# Patient Record
Sex: FEMALE | Race: BLACK OR AFRICAN AMERICAN | NOT HISPANIC OR LATINO | Employment: OTHER | URBAN - METROPOLITAN AREA
[De-identification: names, ages, dates, MRNs, and addresses within clinical notes are randomized per-mention and may not be internally consistent; named-entity substitution may affect disease eponyms.]

---

## 2017-06-08 ENCOUNTER — NEW REFERRAL (OUTPATIENT)
Dept: URBAN - METROPOLITAN AREA CLINIC 26 | Facility: CLINIC | Age: 72
End: 2017-06-08

## 2017-06-08 VITALS
WEIGHT: 172 LBS | HEIGHT: 66 IN | HEART RATE: 74 BPM | BODY MASS INDEX: 27.64 KG/M2 | SYSTOLIC BLOOD PRESSURE: 140 MMHG | DIASTOLIC BLOOD PRESSURE: 94 MMHG

## 2017-06-08 DIAGNOSIS — H25.9: ICD-10-CM

## 2017-06-08 DIAGNOSIS — H43.393: ICD-10-CM

## 2017-06-08 DIAGNOSIS — H35.372: ICD-10-CM

## 2017-06-08 DIAGNOSIS — H04.123: ICD-10-CM

## 2017-06-08 DIAGNOSIS — H02.833: ICD-10-CM

## 2017-06-08 DIAGNOSIS — H02.836: ICD-10-CM

## 2017-06-08 PROCEDURE — 92225 OPHTHALMOSCOPY (INITIAL): CPT

## 2017-06-08 PROCEDURE — 92134 CPTRZ OPH DX IMG PST SGM RTA: CPT

## 2017-06-08 PROCEDURE — 92004 COMPRE OPH EXAM NEW PT 1/>: CPT

## 2017-06-08 ASSESSMENT — TONOMETRY
OS_IOP_MMHG: 14
OD_IOP_MMHG: 17

## 2017-06-08 ASSESSMENT — VISUAL ACUITY
OD_PH: 20/25-2
OD_SC: 20/50-1
OS_PH: 20/30-2
OS_SC: 20/50-1

## 2018-06-05 ENCOUNTER — FOLLOW UP (OUTPATIENT)
Dept: URBAN - METROPOLITAN AREA CLINIC 26 | Facility: CLINIC | Age: 73
End: 2018-06-05

## 2018-06-05 VITALS
SYSTOLIC BLOOD PRESSURE: 138 MMHG | BODY MASS INDEX: 26.03 KG/M2 | DIASTOLIC BLOOD PRESSURE: 82 MMHG | WEIGHT: 162 LBS | HEART RATE: 76 BPM | HEIGHT: 66 IN

## 2018-06-05 DIAGNOSIS — H35.372: ICD-10-CM

## 2018-06-05 DIAGNOSIS — H35.342: ICD-10-CM

## 2018-06-05 DIAGNOSIS — H43.393: ICD-10-CM

## 2018-06-05 PROCEDURE — 92134 CPTRZ OPH DX IMG PST SGM RTA: CPT

## 2018-06-05 PROCEDURE — 92014 COMPRE OPH EXAM EST PT 1/>: CPT

## 2018-06-05 ASSESSMENT — TONOMETRY
OS_IOP_MMHG: 13
OD_IOP_MMHG: 12

## 2018-06-05 ASSESSMENT — VISUAL ACUITY
OS_SC: 20/30-2
OS_PH: 20/25-2
OD_PH: 20/25+1
OD_SC: 20/50-2

## 2019-06-06 ENCOUNTER — FOLLOW UP (OUTPATIENT)
Dept: URBAN - METROPOLITAN AREA CLINIC 26 | Facility: CLINIC | Age: 74
End: 2019-06-06

## 2019-06-06 VITALS — HEIGHT: 66 IN | BODY MASS INDEX: 26.03 KG/M2 | WEIGHT: 162 LBS

## 2019-06-06 DIAGNOSIS — H35.342: ICD-10-CM

## 2019-06-06 DIAGNOSIS — H35.372: ICD-10-CM

## 2019-06-06 DIAGNOSIS — H04.123: ICD-10-CM

## 2019-06-06 DIAGNOSIS — H25.9: ICD-10-CM

## 2019-06-06 DIAGNOSIS — H43.393: ICD-10-CM

## 2019-06-06 DIAGNOSIS — H35.363: ICD-10-CM

## 2019-06-06 DIAGNOSIS — H02.833: ICD-10-CM

## 2019-06-06 DIAGNOSIS — H02.836: ICD-10-CM

## 2019-06-06 PROCEDURE — 92250 FUNDUS PHOTOGRAPHY W/I&R: CPT

## 2019-06-06 PROCEDURE — 92014 COMPRE OPH EXAM EST PT 1/>: CPT

## 2019-06-06 ASSESSMENT — TONOMETRY
OS_IOP_MMHG: 13
OD_IOP_MMHG: 13

## 2019-06-06 ASSESSMENT — VISUAL ACUITY
OD_SC: 20/40+2
OD_PH: 20/25+2
OS_SC: 20/30-2

## 2022-07-09 ENCOUNTER — TELEPHONE ENCOUNTER (OUTPATIENT)
Dept: URBAN - METROPOLITAN AREA CLINIC 121 | Facility: CLINIC | Age: 77
End: 2022-07-09

## 2022-07-09 RX ORDER — OXAPROZIN 600 MG/1
TABLET ORAL
Refills: 0 | OUTPATIENT
Start: 2009-10-28 | End: 2015-04-14

## 2022-07-09 RX ORDER — SIMVASTATIN 20 MG/1
TABLET, FILM COATED ORAL
Refills: 0 | OUTPATIENT
Start: 2009-10-28 | End: 2015-04-14

## 2022-07-09 RX ORDER — OMEPRAZOLE 20 MG/1
TABLET, DELAYED RELEASE ORAL ONCE A DAY
Refills: 11 | OUTPATIENT
Start: 2009-11-16 | End: 2011-01-18

## 2022-07-09 RX ORDER — BENAZEPRIL HYDROCHLORIDE 20 MG/1
TABLET, FILM COATED ORAL
Refills: 0 | OUTPATIENT
Start: 2009-10-28 | End: 2015-04-14

## 2022-07-09 RX ORDER — VALSARTAN AND HYDROCHLOROTHIAZIDE 320; 25 MG/1; MG/1
TABLET, FILM COATED ORAL
Refills: 0 | OUTPATIENT
Start: 2009-10-28 | End: 2015-04-14

## 2022-07-09 RX ORDER — FLUTICASONE PROPIONATE 50 UG/1
SPRAY, METERED NASAL
Refills: 0 | OUTPATIENT
Start: 2009-10-28 | End: 2015-04-14

## 2022-07-10 ENCOUNTER — TELEPHONE ENCOUNTER (OUTPATIENT)
Dept: URBAN - METROPOLITAN AREA CLINIC 121 | Facility: CLINIC | Age: 77
End: 2022-07-10

## 2022-07-10 RX ORDER — MAG HYDROX/ALUMINUM HYD/SIMETH 400-400-40
SUSPENSION, ORAL (FINAL DOSE FORM) ORAL
Refills: 0 | Status: ACTIVE | COMMUNITY
Start: 2015-04-14

## 2022-07-10 RX ORDER — VALSARTAN AND HYDROCHLOROTHIAZIDE 160; 25 MG/1; MG/1
TABLET, FILM COATED ORAL ONCE A DAY
Refills: 0 | Status: ACTIVE | COMMUNITY
Start: 2015-04-14

## 2022-07-10 RX ORDER — ASCORBIC ACID 125 MG
TABLET,CHEWABLE ORAL
Refills: 0 | Status: ACTIVE | COMMUNITY
Start: 2015-04-14

## 2022-07-10 RX ORDER — CETIRIZINE HYDROCHLORIDE 10 MG/1
CAPSULE, LIQUID FILLED ORAL ONCE A DAY
Refills: 0 | Status: ACTIVE | COMMUNITY
Start: 2015-04-14

## 2022-07-10 RX ORDER — OMEPRAZOLE 20 MG/1
TABLET, DELAYED RELEASE ORAL ONCE A DAY
Refills: 11 | Status: ACTIVE | COMMUNITY
Start: 2011-01-18

## 2022-07-10 RX ORDER — UBIDECARENONE/VIT E ACET 100MG-5
CAPSULE ORAL
Refills: 0 | Status: ACTIVE | COMMUNITY
Start: 2015-04-14

## 2022-07-10 RX ORDER — OMEPRAZOLE 20 MG/1
TABLET, DELAYED RELEASE ORAL TWICE A DAY
Refills: 0 | Status: ACTIVE | COMMUNITY
Start: 2015-04-14

## 2022-07-10 RX ORDER — TRAMADOL HYDROCHLORIDE 50 MG/1
TABLET ORAL TAKE AS DIRECTED
Refills: 0 | Status: ACTIVE | COMMUNITY
Start: 2015-04-14

## 2022-07-10 RX ORDER — CELECOXIB 200 MG
CAPSULE ORAL ONCE A DAY
Refills: 0 | Status: ACTIVE | COMMUNITY
Start: 2015-04-14

## 2025-04-15 ENCOUNTER — OFFICE VISIT (OUTPATIENT)
Dept: URBAN - METROPOLITAN AREA CLINIC 63 | Facility: CLINIC | Age: 80
End: 2025-04-15

## 2025-04-15 ENCOUNTER — DASHBOARD ENCOUNTERS (OUTPATIENT)
Age: 80
End: 2025-04-15

## 2025-04-15 RX ORDER — TRAMADOL HYDROCHLORIDE 50 MG/1
TABLET ORAL TAKE AS DIRECTED
Refills: 0 | Status: ACTIVE | COMMUNITY
Start: 2015-04-14

## 2025-04-15 RX ORDER — OMEPRAZOLE 20 MG/1
TABLET, DELAYED RELEASE ORAL ONCE A DAY
Refills: 11 | Status: ACTIVE | COMMUNITY
Start: 2011-01-18

## 2025-04-15 RX ORDER — MAG HYDROX/ALUMINUM HYD/SIMETH 400-400-40
SUSPENSION, ORAL (FINAL DOSE FORM) ORAL
Refills: 0 | Status: ACTIVE | COMMUNITY
Start: 2015-04-14

## 2025-04-15 RX ORDER — CETIRIZINE HYDROCHLORIDE 10 MG/1
CAPSULE, LIQUID FILLED ORAL ONCE A DAY
Refills: 0 | Status: ACTIVE | COMMUNITY
Start: 2015-04-14

## 2025-04-15 RX ORDER — ASCORBIC ACID 125 MG
TABLET,CHEWABLE ORAL
Refills: 0 | Status: ACTIVE | COMMUNITY
Start: 2015-04-14

## 2025-04-15 RX ORDER — CELECOXIB 200 MG
CAPSULE ORAL ONCE A DAY
Refills: 0 | Status: ACTIVE | COMMUNITY
Start: 2015-04-14

## 2025-04-15 RX ORDER — VALSARTAN AND HYDROCHLOROTHIAZIDE 160; 25 MG/1; MG/1
TABLET, FILM COATED ORAL ONCE A DAY
Refills: 0 | Status: ACTIVE | COMMUNITY
Start: 2015-04-14

## 2025-04-15 RX ORDER — UBIDECARENONE/VIT E ACET 100MG-5
CAPSULE ORAL
Refills: 0 | Status: ACTIVE | COMMUNITY
Start: 2015-04-14

## 2025-04-15 NOTE — HPI-TODAY'S VISIT:
This is a very pleasant 79-year-old female who presents to the office with a chief complaint of "iron deficiency".  Past medical history is significant for osteoarthritis, allergic rhinitis, GERD, hypertension, hyperlipidemia, MAIKEL, GERD, neuropathy, renal insufficiency.  Past surgical history is significant for colonoscopy.  Family history is noncontributory. Last colonoscopy 5/27/2015, Dr. Emanuel, 5-year recall Patient is endoscopy naive. Cardiologist:? . Patient was last seen in the office on 4/14/2015 with Dr. Emanuel.  At that visit, patient had a colonoscopy approximately 5 years prior in 2009 and colon polyps were removed.  She was due for CRC screening, colonoscopy was ordered.  She had no GI complaint at the time. PCP referral notes patient takes NSAIDs and Celebrex for OA

## 2025-04-15 NOTE — HPI-PREVIOUS PROCEDURES
Colonoscopy, 5/27/2015, Dr. Emanuel - Diverticulosis in the entire examined colon. - Diverticulosis in the sigmoid colon. - Patent side-to-side ileocolonic anastomosis, characterized by healthy-appearing mucosa. - No specimens collected. - Repeat colonoscopy in 5 years for surveillance, per Dr. Emanuel.